# Patient Record
Sex: FEMALE | Race: BLACK OR AFRICAN AMERICAN | NOT HISPANIC OR LATINO | URBAN - METROPOLITAN AREA
[De-identification: names, ages, dates, MRNs, and addresses within clinical notes are randomized per-mention and may not be internally consistent; named-entity substitution may affect disease eponyms.]

---

## 2017-12-14 ENCOUNTER — EMERGENCY (EMERGENCY)
Facility: HOSPITAL | Age: 22
LOS: 1 days | Discharge: HOME | End: 2017-12-14
Attending: EMERGENCY MEDICINE | Admitting: EMERGENCY MEDICINE
Payer: COMMERCIAL

## 2017-12-14 VITALS
TEMPERATURE: 97 F | OXYGEN SATURATION: 97 % | SYSTOLIC BLOOD PRESSURE: 106 MMHG | WEIGHT: 150.8 LBS | RESPIRATION RATE: 18 BRPM | HEART RATE: 81 BPM | DIASTOLIC BLOOD PRESSURE: 74 MMHG

## 2017-12-14 DIAGNOSIS — R10.11 RIGHT UPPER QUADRANT PAIN: ICD-10-CM

## 2017-12-14 DIAGNOSIS — I26.99 OTHER PULMONARY EMBOLISM WITHOUT ACUTE COR PULMONALE: ICD-10-CM

## 2017-12-14 PROCEDURE — 99285 EMERGENCY DEPT VISIT HI MDM: CPT | Mod: 25

## 2017-12-14 PROCEDURE — 71020: CPT | Mod: 26

## 2017-12-14 PROCEDURE — 99053 MED SERV 10PM-8AM 24 HR FAC: CPT

## 2017-12-14 PROCEDURE — 93010 ELECTROCARDIOGRAM REPORT: CPT

## 2017-12-14 NOTE — ED ADULT TRIAGE NOTE - ARRIVAL INFO ADDITIONAL COMMENTS
right side chest pain and right side abd pain started today "when I take a deep breath in it hurts" right side chest pain and right side abd pain started today "when I take a deep breath in it hurts". recent travel, denies leg swelling

## 2017-12-14 NOTE — ED ADULT NURSE NOTE - OBJECTIVE STATEMENT
Pt walked into Ed with c/o of right 8/10 continuos chest pain that radiates to the RUQ abd. Pt states onset was this morning at 8 am. Pt denies fever, chills, N+V+D, blood in stool or urine, dysuria. PT states she had ectopic pregnancy salpingectomy (unknown which side was removed), surgery took place last month. PT. denies taking anything for the pain.

## 2017-12-14 NOTE — ED ADULT NURSE NOTE - NSSISCREENINGQ5_ED_A_ED
"Per 1/5/17 result note:  \"Notes Recorded by Majo Alfaro MD on 1/9/2017 at 8:47 AM  There was some improvement in your potassium (back into the normal range) and your kidney function this time. I suspect the dip in both was related to the reclast. We will continue to monitor both at your next scheduled visit. Please let us know if you have any questions or concerns.\"    Writer called patient and reviewed this result note with patient.    Patient verbalized understanding and in agreement with plan.  ALAN McnallyN, RN    "
Patient is calling to speak with Dr Alfaro or someone on her team.  Wants to discuss what she should be doing since her lab results showed low potassium.  Please call today if possible.  OK to leave message on voicemail.  
No

## 2017-12-15 ENCOUNTER — EMERGENCY (EMERGENCY)
Facility: HOSPITAL | Age: 22
LOS: 1 days | Discharge: HOME | End: 2017-12-15
Attending: EMERGENCY MEDICINE | Admitting: EMERGENCY MEDICINE
Payer: COMMERCIAL

## 2017-12-15 VITALS
TEMPERATURE: 98 F | HEART RATE: 78 BPM | DIASTOLIC BLOOD PRESSURE: 60 MMHG | RESPIRATION RATE: 18 BRPM | SYSTOLIC BLOOD PRESSURE: 100 MMHG | OXYGEN SATURATION: 98 %

## 2017-12-15 VITALS
HEIGHT: 63 IN | DIASTOLIC BLOOD PRESSURE: 78 MMHG | TEMPERATURE: 98 F | SYSTOLIC BLOOD PRESSURE: 113 MMHG | HEART RATE: 69 BPM | RESPIRATION RATE: 18 BRPM | WEIGHT: 152.78 LBS | OXYGEN SATURATION: 98 %

## 2017-12-15 DIAGNOSIS — Z90.79 ACQUIRED ABSENCE OF OTHER GENITAL ORGAN(S): Chronic | ICD-10-CM

## 2017-12-15 DIAGNOSIS — Z76.0 ENCOUNTER FOR ISSUE OF REPEAT PRESCRIPTION: ICD-10-CM

## 2017-12-15 DIAGNOSIS — I26.99 OTHER PULMONARY EMBOLISM WITHOUT ACUTE COR PULMONALE: ICD-10-CM

## 2017-12-15 DIAGNOSIS — Z79.01 LONG TERM (CURRENT) USE OF ANTICOAGULANTS: ICD-10-CM

## 2017-12-15 LAB
ALBUMIN SERPL ELPH-MCNC: 4.2 G/DL — SIGNIFICANT CHANGE UP (ref 3.3–5)
ALP SERPL-CCNC: 47 U/L — SIGNIFICANT CHANGE UP (ref 40–120)
ALT FLD-CCNC: 9 U/L — LOW (ref 10–45)
ANION GAP SERPL CALC-SCNC: 12 MMOL/L — SIGNIFICANT CHANGE UP (ref 5–17)
APPEARANCE UR: CLEAR — SIGNIFICANT CHANGE UP
APTT BLD: 26.9 SEC — LOW (ref 27.5–37.4)
AST SERPL-CCNC: 17 U/L — SIGNIFICANT CHANGE UP (ref 10–40)
BASOPHILS NFR BLD AUTO: 0.4 % — SIGNIFICANT CHANGE UP (ref 0–2)
BILIRUB SERPL-MCNC: 0.2 MG/DL — SIGNIFICANT CHANGE UP (ref 0.2–1.2)
BILIRUB UR-MCNC: NEGATIVE — SIGNIFICANT CHANGE UP
BUN SERPL-MCNC: 16 MG/DL — SIGNIFICANT CHANGE UP (ref 7–23)
CALCIUM SERPL-MCNC: 9.2 MG/DL — SIGNIFICANT CHANGE UP (ref 8.4–10.5)
CHLORIDE SERPL-SCNC: 101 MMOL/L — SIGNIFICANT CHANGE UP (ref 96–108)
CO2 SERPL-SCNC: 23 MMOL/L — SIGNIFICANT CHANGE UP (ref 22–31)
COLOR SPEC: YELLOW — SIGNIFICANT CHANGE UP
CREAT SERPL-MCNC: 0.78 MG/DL — SIGNIFICANT CHANGE UP (ref 0.5–1.3)
D DIMER BLD IA.RAPID-MCNC: 437 NG/ML DDU — HIGH
DIFF PNL FLD: NEGATIVE — SIGNIFICANT CHANGE UP
EOSINOPHIL NFR BLD AUTO: 1.4 % — SIGNIFICANT CHANGE UP (ref 0–6)
GLUCOSE SERPL-MCNC: 90 MG/DL — SIGNIFICANT CHANGE UP (ref 70–99)
GLUCOSE UR QL: NEGATIVE — SIGNIFICANT CHANGE UP
HCT VFR BLD CALC: 35.9 % — SIGNIFICANT CHANGE UP (ref 34.5–45)
HGB BLD-MCNC: 11.8 G/DL — SIGNIFICANT CHANGE UP (ref 11.5–15.5)
INR BLD: 1.09 — SIGNIFICANT CHANGE UP (ref 0.88–1.16)
KETONES UR-MCNC: NEGATIVE — SIGNIFICANT CHANGE UP
LEUKOCYTE ESTERASE UR-ACNC: NEGATIVE — SIGNIFICANT CHANGE UP
LIDOCAIN IGE QN: 16 U/L — SIGNIFICANT CHANGE UP (ref 7–60)
LYMPHOCYTES # BLD AUTO: 39.1 % — SIGNIFICANT CHANGE UP (ref 13–44)
MCHC RBC-ENTMCNC: 30.7 PG — SIGNIFICANT CHANGE UP (ref 27–34)
MCHC RBC-ENTMCNC: 32.9 G/DL — SIGNIFICANT CHANGE UP (ref 32–36)
MCV RBC AUTO: 93.5 FL — SIGNIFICANT CHANGE UP (ref 80–100)
MONOCYTES NFR BLD AUTO: 9.4 % — SIGNIFICANT CHANGE UP (ref 2–14)
NEUTROPHILS NFR BLD AUTO: 49.7 % — SIGNIFICANT CHANGE UP (ref 43–77)
NITRITE UR-MCNC: NEGATIVE — SIGNIFICANT CHANGE UP
PH UR: 6 — SIGNIFICANT CHANGE UP (ref 5–8)
PLATELET # BLD AUTO: 172 K/UL — SIGNIFICANT CHANGE UP (ref 150–400)
POTASSIUM SERPL-MCNC: 3.6 MMOL/L — SIGNIFICANT CHANGE UP (ref 3.5–5.3)
POTASSIUM SERPL-SCNC: 3.6 MMOL/L — SIGNIFICANT CHANGE UP (ref 3.5–5.3)
PROT SERPL-MCNC: 8.3 G/DL — SIGNIFICANT CHANGE UP (ref 6–8.3)
PROT UR-MCNC: NEGATIVE MG/DL — SIGNIFICANT CHANGE UP
PROTHROM AB SERPL-ACNC: 12.1 SEC — SIGNIFICANT CHANGE UP (ref 9.8–12.7)
RBC # BLD: 3.84 M/UL — SIGNIFICANT CHANGE UP (ref 3.8–5.2)
RBC # FLD: 13.2 % — SIGNIFICANT CHANGE UP (ref 10.3–16.9)
SODIUM SERPL-SCNC: 136 MMOL/L — SIGNIFICANT CHANGE UP (ref 135–145)
SP GR SPEC: >=1.03 — SIGNIFICANT CHANGE UP (ref 1–1.03)
UROBILINOGEN FLD QL: 0.2 E.U./DL — SIGNIFICANT CHANGE UP
WBC # BLD: 6.9 K/UL — SIGNIFICANT CHANGE UP (ref 3.8–10.5)
WBC # FLD AUTO: 6.9 K/UL — SIGNIFICANT CHANGE UP (ref 3.8–10.5)

## 2017-12-15 PROCEDURE — 83690 ASSAY OF LIPASE: CPT

## 2017-12-15 PROCEDURE — 80053 COMPREHEN METABOLIC PANEL: CPT

## 2017-12-15 PROCEDURE — 74177 CT ABD & PELVIS W/CONTRAST: CPT | Mod: 26

## 2017-12-15 PROCEDURE — 71275 CT ANGIOGRAPHY CHEST: CPT | Mod: 26

## 2017-12-15 PROCEDURE — 71275 CT ANGIOGRAPHY CHEST: CPT

## 2017-12-15 PROCEDURE — 81003 URINALYSIS AUTO W/O SCOPE: CPT

## 2017-12-15 PROCEDURE — 99283 EMERGENCY DEPT VISIT LOW MDM: CPT

## 2017-12-15 PROCEDURE — 93005 ELECTROCARDIOGRAM TRACING: CPT

## 2017-12-15 PROCEDURE — 36415 COLL VENOUS BLD VENIPUNCTURE: CPT

## 2017-12-15 PROCEDURE — 85025 COMPLETE CBC W/AUTO DIFF WBC: CPT

## 2017-12-15 PROCEDURE — 85379 FIBRIN DEGRADATION QUANT: CPT

## 2017-12-15 PROCEDURE — 99284 EMERGENCY DEPT VISIT MOD MDM: CPT | Mod: 25

## 2017-12-15 PROCEDURE — 71046 X-RAY EXAM CHEST 2 VIEWS: CPT

## 2017-12-15 PROCEDURE — 85730 THROMBOPLASTIN TIME PARTIAL: CPT

## 2017-12-15 PROCEDURE — 74177 CT ABD & PELVIS W/CONTRAST: CPT

## 2017-12-15 PROCEDURE — 85610 PROTHROMBIN TIME: CPT

## 2017-12-15 PROCEDURE — 71020: CPT | Mod: 26

## 2017-12-15 RX ORDER — FONDAPARINUX SODIUM 2.5 MG/.5ML
1 INJECTION, SOLUTION SUBCUTANEOUS
Qty: 42 | Refills: 0 | OUTPATIENT
Start: 2017-12-15 | End: 2018-01-04

## 2017-12-15 RX ORDER — SODIUM CHLORIDE 9 MG/ML
1000 INJECTION INTRAMUSCULAR; INTRAVENOUS; SUBCUTANEOUS ONCE
Qty: 0 | Refills: 0 | Status: COMPLETED | OUTPATIENT
Start: 2017-12-15 | End: 2017-12-15

## 2017-12-15 RX ORDER — SODIUM CHLORIDE 9 MG/ML
1000 INJECTION INTRAMUSCULAR; INTRAVENOUS; SUBCUTANEOUS
Qty: 0 | Refills: 0 | Status: DISCONTINUED | OUTPATIENT
Start: 2017-12-15 | End: 2017-12-18

## 2017-12-15 RX ORDER — FONDAPARINUX SODIUM 2.5 MG/.5ML
1 INJECTION, SOLUTION SUBCUTANEOUS
Qty: 14 | Refills: 0 | OUTPATIENT
Start: 2017-12-15 | End: 2017-12-21

## 2017-12-15 RX ORDER — RIVAROXABAN 15 MG-20MG
15 KIT ORAL ONCE
Qty: 0 | Refills: 0 | Status: COMPLETED | OUTPATIENT
Start: 2017-12-15 | End: 2017-12-15

## 2017-12-15 RX ORDER — IOHEXOL 300 MG/ML
50 INJECTION, SOLUTION INTRAVENOUS ONCE
Qty: 0 | Refills: 0 | Status: COMPLETED | OUTPATIENT
Start: 2017-12-15 | End: 2017-12-15

## 2017-12-15 RX ADMIN — SODIUM CHLORIDE 2000 MILLILITER(S): 9 INJECTION INTRAMUSCULAR; INTRAVENOUS; SUBCUTANEOUS at 02:01

## 2017-12-15 RX ADMIN — IOHEXOL 50 MILLILITER(S): 300 INJECTION, SOLUTION INTRAVENOUS at 00:27

## 2017-12-15 RX ADMIN — SODIUM CHLORIDE 125 MILLILITER(S): 9 INJECTION INTRAMUSCULAR; INTRAVENOUS; SUBCUTANEOUS at 00:27

## 2017-12-15 RX ADMIN — RIVAROXABAN 15 MILLIGRAM(S): KIT at 03:32

## 2017-12-15 NOTE — ED PROVIDER NOTE - MEDICAL DECISION MAKING DETAILS
here w/ need for medication for PE, unable to fill in pharmacy. 1 weeks worth given, pt to call insurance and find out what happened and get insurance reinstated.

## 2017-12-15 NOTE — ED PROVIDER NOTE - CARE PLAN
Principal Discharge DX:	Other acute pulmonary embolism without acute cor pulmonale  Secondary Diagnosis:	Medication refill

## 2017-12-15 NOTE — ED ADULT NURSE NOTE - OBJECTIVE STATEMENT
Pt came to ED for med refill.  Pt denies any medical complaints. Pt was seen at [facility] yesterday, states pharmacy will not taker her insurance.  Pt alert and orientedx3.

## 2017-12-15 NOTE — ED PROVIDER NOTE - MEDICAL DECISION MAKING DETAILS
Pt c/o ruq pleuritic type pain w some risk for pe (travel, surgery) w/o sob, hypoxia; no ttp on abd exam.  S/p cholecystectomy.  Doubt cbd stone.  Doubt post op complication.  No sx to suggest Luigi Singleton.  Plan labs, ct abd, poss cta for pe if abnl ddimer; pt declined pain meds.  Reassess.

## 2017-12-15 NOTE — ED PROVIDER NOTE - PROGRESS NOTE DETAILS
Pt had ct abd prior to my review of her ddimer level; cta for pe added to labs w iv hydration to help protect her kidneys. CTA + segmental pe.  Will start xarelto; pt to fu w pmd.

## 2017-12-15 NOTE — ED PROVIDER NOTE - OBJECTIVE STATEMENT
23yo F diagnosed with PE yesterday, small, thought safe for dc and start on NOAC, however when she went to pharmacy to fill - found out her insurance got canceled. Returns to ED requesting help with rx. symptoms have not progressed since discharge, feels well otherwise.

## 2017-12-15 NOTE — ED PROVIDER NOTE - OBJECTIVE STATEMENT
21 yo female h/o ectopic s/p salpingectomy (unsure which side) 10/17, cholecystectomy c/o ruq pain since this am - worse w taking a deep breath, no radiation, no associated n/v/d, fever, flank pain, dysuria, hematuria, abnl discharge or vag bleeding (lmp late Nov), cough, sob, palpitations, le pain/swelling, h/o kidney stone, h/o or fh dvt/pe, ocp or tob use.  + travel to [facility] 11/26/17.  Pain 6/10, sharp, constant.

## 2017-12-15 NOTE — ED PROVIDER NOTE - DIAGNOSTIC INTERPRETATION
ER Physician:  Kadie Director  CHEST XRAY INTERPRETATION: lungs clear, heart shadow normal, bony structures intact

## 2017-12-21 ENCOUNTER — EMERGENCY (EMERGENCY)
Facility: HOSPITAL | Age: 22
LOS: 1 days | Discharge: HOME | End: 2017-12-21
Attending: EMERGENCY MEDICINE | Admitting: EMERGENCY MEDICINE
Payer: COMMERCIAL

## 2017-12-21 VITALS
HEART RATE: 73 BPM | SYSTOLIC BLOOD PRESSURE: 107 MMHG | RESPIRATION RATE: 19 BRPM | DIASTOLIC BLOOD PRESSURE: 67 MMHG | OXYGEN SATURATION: 100 % | TEMPERATURE: 99 F

## 2017-12-21 VITALS
HEIGHT: 63 IN | OXYGEN SATURATION: 100 % | WEIGHT: 145.06 LBS | RESPIRATION RATE: 19 BRPM | DIASTOLIC BLOOD PRESSURE: 72 MMHG | TEMPERATURE: 97 F | HEART RATE: 81 BPM | SYSTOLIC BLOOD PRESSURE: 105 MMHG

## 2017-12-21 DIAGNOSIS — Z79.899 OTHER LONG TERM (CURRENT) DRUG THERAPY: ICD-10-CM

## 2017-12-21 DIAGNOSIS — Z90.79 ACQUIRED ABSENCE OF OTHER GENITAL ORGAN(S): Chronic | ICD-10-CM

## 2017-12-21 DIAGNOSIS — R00.2 PALPITATIONS: ICD-10-CM

## 2017-12-21 DIAGNOSIS — R07.89 OTHER CHEST PAIN: ICD-10-CM

## 2017-12-21 DIAGNOSIS — R06.02 SHORTNESS OF BREATH: ICD-10-CM

## 2017-12-21 LAB
ALBUMIN SERPL ELPH-MCNC: 4.4 G/DL — SIGNIFICANT CHANGE UP (ref 3.3–5)
ALP SERPL-CCNC: 45 U/L — SIGNIFICANT CHANGE UP (ref 40–120)
ALT FLD-CCNC: 10 U/L — SIGNIFICANT CHANGE UP (ref 10–45)
ANION GAP SERPL CALC-SCNC: 11 MMOL/L — SIGNIFICANT CHANGE UP (ref 5–17)
APTT BLD: 37.4 SEC — SIGNIFICANT CHANGE UP (ref 27.5–37.4)
AST SERPL-CCNC: 20 U/L — SIGNIFICANT CHANGE UP (ref 10–40)
BASOPHILS NFR BLD AUTO: 0.9 % — SIGNIFICANT CHANGE UP (ref 0–2)
BILIRUB SERPL-MCNC: 0.2 MG/DL — SIGNIFICANT CHANGE UP (ref 0.2–1.2)
BUN SERPL-MCNC: 11 MG/DL — SIGNIFICANT CHANGE UP (ref 7–23)
CALCIUM SERPL-MCNC: 9 MG/DL — SIGNIFICANT CHANGE UP (ref 8.4–10.5)
CHLORIDE SERPL-SCNC: 102 MMOL/L — SIGNIFICANT CHANGE UP (ref 96–108)
CO2 SERPL-SCNC: 24 MMOL/L — SIGNIFICANT CHANGE UP (ref 22–31)
CREAT SERPL-MCNC: 0.87 MG/DL — SIGNIFICANT CHANGE UP (ref 0.5–1.3)
EOSINOPHIL NFR BLD AUTO: 2 % — SIGNIFICANT CHANGE UP (ref 0–6)
EXTRA SST TUBE: SIGNIFICANT CHANGE UP
GLUCOSE SERPL-MCNC: 96 MG/DL — SIGNIFICANT CHANGE UP (ref 70–99)
HCT VFR BLD CALC: 32.1 % — LOW (ref 34.5–45)
HGB BLD-MCNC: 10.9 G/DL — LOW (ref 11.5–15.5)
INR BLD: 2.16 — HIGH (ref 0.88–1.16)
LYMPHOCYTES # BLD AUTO: 51.6 % — HIGH (ref 13–44)
MCHC RBC-ENTMCNC: 30.2 PG — SIGNIFICANT CHANGE UP (ref 27–34)
MCHC RBC-ENTMCNC: 34 G/DL — SIGNIFICANT CHANGE UP (ref 32–36)
MCV RBC AUTO: 88.9 FL — SIGNIFICANT CHANGE UP (ref 80–100)
MONOCYTES NFR BLD AUTO: 13.2 % — SIGNIFICANT CHANGE UP (ref 2–14)
NEUTROPHILS NFR BLD AUTO: 32.3 % — LOW (ref 43–77)
PLATELET # BLD AUTO: 170 K/UL — SIGNIFICANT CHANGE UP (ref 150–400)
POTASSIUM SERPL-MCNC: 3.9 MMOL/L — SIGNIFICANT CHANGE UP (ref 3.5–5.3)
POTASSIUM SERPL-SCNC: 3.9 MMOL/L — SIGNIFICANT CHANGE UP (ref 3.5–5.3)
PROT SERPL-MCNC: 7.9 G/DL — SIGNIFICANT CHANGE UP (ref 6–8.3)
PROTHROM AB SERPL-ACNC: 24.4 SEC — HIGH (ref 9.8–12.7)
RBC # BLD: 3.61 M/UL — LOW (ref 3.8–5.2)
RBC # FLD: 12.5 % — SIGNIFICANT CHANGE UP (ref 10.3–16.9)
SODIUM SERPL-SCNC: 137 MMOL/L — SIGNIFICANT CHANGE UP (ref 135–145)
TROPONIN T SERPL-MCNC: <0.01 NG/ML — SIGNIFICANT CHANGE UP (ref 0–0.01)
WBC # BLD: 4.6 K/UL — SIGNIFICANT CHANGE UP (ref 3.8–10.5)
WBC # FLD AUTO: 4.6 K/UL — SIGNIFICANT CHANGE UP (ref 3.8–10.5)

## 2017-12-21 PROCEDURE — 85025 COMPLETE CBC W/AUTO DIFF WBC: CPT

## 2017-12-21 PROCEDURE — 85730 THROMBOPLASTIN TIME PARTIAL: CPT

## 2017-12-21 PROCEDURE — 71275 CT ANGIOGRAPHY CHEST: CPT | Mod: 26

## 2017-12-21 PROCEDURE — 83880 ASSAY OF NATRIURETIC PEPTIDE: CPT

## 2017-12-21 PROCEDURE — 85610 PROTHROMBIN TIME: CPT

## 2017-12-21 PROCEDURE — 93005 ELECTROCARDIOGRAM TRACING: CPT

## 2017-12-21 PROCEDURE — 93010 ELECTROCARDIOGRAM REPORT: CPT

## 2017-12-21 PROCEDURE — 36415 COLL VENOUS BLD VENIPUNCTURE: CPT

## 2017-12-21 PROCEDURE — 84484 ASSAY OF TROPONIN QUANT: CPT

## 2017-12-21 PROCEDURE — 80053 COMPREHEN METABOLIC PANEL: CPT

## 2017-12-21 PROCEDURE — 99285 EMERGENCY DEPT VISIT HI MDM: CPT | Mod: 25

## 2017-12-21 PROCEDURE — 71275 CT ANGIOGRAPHY CHEST: CPT

## 2017-12-21 PROCEDURE — 99284 EMERGENCY DEPT VISIT MOD MDM: CPT | Mod: 25

## 2017-12-21 NOTE — ED PROVIDER NOTE - MEDICAL DECISION MAKING DETAILS
chest pain, palpitation, dx of PE, on xarelto chest pain, palpitation, dx of PE, on xarelto, unclear status of hypercoagulability, possibly worsening PE despite NOAC, will repeat CTA, reassess, cardiac panels ordered for PE prognosis, very low suspicion for ACS

## 2017-12-21 NOTE — ED ADULT NURSE NOTE - OBJECTIVE STATEMENT
pt states dx with PE last week. on xaralto.  started having midsternal throbbing chest pain 8/10 this afternoon, currently 6/10.  denies SOB, palpitations, dizziness, headache.   lungs cta.  cardiac monitor aplied. ekg completed at bedside. denies n/v/d. no ELOY noted

## 2017-12-21 NOTE — ED PROVIDER NOTE - PROGRESS NOTE DETAILS
ct reviewed, resolution of clots, d/w pt findings, instructed to still follow up, and continue xalreto ct reviewed w/ patient, aware not final read, does not wish to wait for final result, resolution of clots, d/w pt findings, instructed to still follow up, and continue xalreto

## 2017-12-21 NOTE — ED PROVIDER NOTE - OBJECTIVE STATEMENT
22 yof pw sternal cp today, so palpitations and felt "funny" with breathing.  dx w/ PE 2 wk ago, on xarelto, compliant.  no hx of known lupus/sle/hypercoagulable state.  no vomiting.  no hx of CAD.  no leg pain or swelling.  pt states cp felt different in location, previously was in right lower chest, now sternal. 22 yof pw sternal cp today, so palpitations and felt "funny" with breathing.  dx w/ PE 2 wk ago, on xarelto, compliant.  no hx of known lupus/sle/hypercoagulable state.  no vomiting.  no hx of CAD.  no leg pain or swelling.  pt states cp felt different in location, previously was in right lower chest, now sternal.  nonexertional, no dyspnea.

## 2018-01-02 ENCOUNTER — EMERGENCY (EMERGENCY)
Facility: HOSPITAL | Age: 23
LOS: 1 days | Discharge: HOME | End: 2018-01-02
Attending: EMERGENCY MEDICINE | Admitting: EMERGENCY MEDICINE
Payer: COMMERCIAL

## 2018-01-02 VITALS
SYSTOLIC BLOOD PRESSURE: 120 MMHG | RESPIRATION RATE: 18 BRPM | DIASTOLIC BLOOD PRESSURE: 66 MMHG | WEIGHT: 145.06 LBS | TEMPERATURE: 98 F | OXYGEN SATURATION: 100 % | HEART RATE: 88 BPM

## 2018-01-02 DIAGNOSIS — Z90.79 ACQUIRED ABSENCE OF OTHER GENITAL ORGAN(S): Chronic | ICD-10-CM

## 2018-01-02 DIAGNOSIS — Z79.899 OTHER LONG TERM (CURRENT) DRUG THERAPY: ICD-10-CM

## 2018-01-02 DIAGNOSIS — R10.10 UPPER ABDOMINAL PAIN, UNSPECIFIED: ICD-10-CM

## 2018-01-02 DIAGNOSIS — R10.31 RIGHT LOWER QUADRANT PAIN: ICD-10-CM

## 2018-01-02 LAB
ALBUMIN SERPL ELPH-MCNC: 4.3 G/DL — SIGNIFICANT CHANGE UP (ref 3.3–5)
ALP SERPL-CCNC: 42 U/L — SIGNIFICANT CHANGE UP (ref 40–120)
ALT FLD-CCNC: 11 U/L — SIGNIFICANT CHANGE UP (ref 10–45)
ANION GAP SERPL CALC-SCNC: 12 MMOL/L — SIGNIFICANT CHANGE UP (ref 5–17)
APPEARANCE UR: CLEAR — SIGNIFICANT CHANGE UP
APTT BLD: 35 SEC — SIGNIFICANT CHANGE UP (ref 27.5–37.4)
AST SERPL-CCNC: 21 U/L — SIGNIFICANT CHANGE UP (ref 10–40)
BASOPHILS NFR BLD AUTO: 0.2 % — SIGNIFICANT CHANGE UP (ref 0–2)
BILIRUB SERPL-MCNC: 0.2 MG/DL — SIGNIFICANT CHANGE UP (ref 0.2–1.2)
BILIRUB UR-MCNC: NEGATIVE — SIGNIFICANT CHANGE UP
BUN SERPL-MCNC: 15 MG/DL — SIGNIFICANT CHANGE UP (ref 7–23)
CALCIUM SERPL-MCNC: 9.4 MG/DL — SIGNIFICANT CHANGE UP (ref 8.4–10.5)
CHLORIDE SERPL-SCNC: 102 MMOL/L — SIGNIFICANT CHANGE UP (ref 96–108)
CO2 SERPL-SCNC: 24 MMOL/L — SIGNIFICANT CHANGE UP (ref 22–31)
COLOR SPEC: YELLOW — SIGNIFICANT CHANGE UP
CREAT SERPL-MCNC: 0.74 MG/DL — SIGNIFICANT CHANGE UP (ref 0.5–1.3)
D DIMER BLD IA.RAPID-MCNC: <150 NG/ML DDU — SIGNIFICANT CHANGE UP
DIFF PNL FLD: NEGATIVE — SIGNIFICANT CHANGE UP
EOSINOPHIL NFR BLD AUTO: 1.8 % — SIGNIFICANT CHANGE UP (ref 0–6)
GLUCOSE SERPL-MCNC: 92 MG/DL — SIGNIFICANT CHANGE UP (ref 70–99)
GLUCOSE UR QL: NEGATIVE — SIGNIFICANT CHANGE UP
HCT VFR BLD CALC: 34.9 % — SIGNIFICANT CHANGE UP (ref 34.5–45)
HGB BLD-MCNC: 11.3 G/DL — LOW (ref 11.5–15.5)
INR BLD: 1.73 — HIGH (ref 0.88–1.16)
KETONES UR-MCNC: NEGATIVE — SIGNIFICANT CHANGE UP
LEUKOCYTE ESTERASE UR-ACNC: NEGATIVE — SIGNIFICANT CHANGE UP
LIDOCAIN IGE QN: 23 U/L — SIGNIFICANT CHANGE UP (ref 7–60)
LYMPHOCYTES # BLD AUTO: 44.8 % — HIGH (ref 13–44)
MCHC RBC-ENTMCNC: 29.6 PG — SIGNIFICANT CHANGE UP (ref 27–34)
MCHC RBC-ENTMCNC: 32.4 G/DL — SIGNIFICANT CHANGE UP (ref 32–36)
MCV RBC AUTO: 91.4 FL — SIGNIFICANT CHANGE UP (ref 80–100)
MONOCYTES NFR BLD AUTO: 11.8 % — SIGNIFICANT CHANGE UP (ref 2–14)
NEUTROPHILS NFR BLD AUTO: 41.4 % — LOW (ref 43–77)
NITRITE UR-MCNC: NEGATIVE — SIGNIFICANT CHANGE UP
PH UR: 6.5 — SIGNIFICANT CHANGE UP (ref 5–8)
PLATELET # BLD AUTO: 148 K/UL — LOW (ref 150–400)
POTASSIUM SERPL-MCNC: 3.7 MMOL/L — SIGNIFICANT CHANGE UP (ref 3.5–5.3)
POTASSIUM SERPL-SCNC: 3.7 MMOL/L — SIGNIFICANT CHANGE UP (ref 3.5–5.3)
PROT SERPL-MCNC: 8 G/DL — SIGNIFICANT CHANGE UP (ref 6–8.3)
PROT UR-MCNC: NEGATIVE MG/DL — SIGNIFICANT CHANGE UP
PROTHROM AB SERPL-ACNC: 19.4 SEC — HIGH (ref 9.8–12.7)
RBC # BLD: 3.82 M/UL — SIGNIFICANT CHANGE UP (ref 3.8–5.2)
RBC # FLD: 12.1 % — SIGNIFICANT CHANGE UP (ref 10.3–16.9)
SODIUM SERPL-SCNC: 138 MMOL/L — SIGNIFICANT CHANGE UP (ref 135–145)
SP GR SPEC: 1.02 — SIGNIFICANT CHANGE UP (ref 1–1.03)
UROBILINOGEN FLD QL: 0.2 E.U./DL — SIGNIFICANT CHANGE UP
WBC # BLD: 5.1 K/UL — SIGNIFICANT CHANGE UP (ref 3.8–10.5)
WBC # FLD AUTO: 5.1 K/UL — SIGNIFICANT CHANGE UP (ref 3.8–10.5)

## 2018-01-02 PROCEDURE — 85730 THROMBOPLASTIN TIME PARTIAL: CPT

## 2018-01-02 PROCEDURE — 99285 EMERGENCY DEPT VISIT HI MDM: CPT | Mod: 25

## 2018-01-02 PROCEDURE — 85025 COMPLETE CBC W/AUTO DIFF WBC: CPT

## 2018-01-02 PROCEDURE — 83690 ASSAY OF LIPASE: CPT

## 2018-01-02 PROCEDURE — 93010 ELECTROCARDIOGRAM REPORT: CPT

## 2018-01-02 PROCEDURE — 85379 FIBRIN DEGRADATION QUANT: CPT

## 2018-01-02 PROCEDURE — 80053 COMPREHEN METABOLIC PANEL: CPT

## 2018-01-02 PROCEDURE — 99284 EMERGENCY DEPT VISIT MOD MDM: CPT | Mod: 25

## 2018-01-02 PROCEDURE — 36415 COLL VENOUS BLD VENIPUNCTURE: CPT

## 2018-01-02 PROCEDURE — 85610 PROTHROMBIN TIME: CPT

## 2018-01-02 PROCEDURE — 93005 ELECTROCARDIOGRAM TRACING: CPT

## 2018-01-02 PROCEDURE — 81003 URINALYSIS AUTO W/O SCOPE: CPT

## 2018-01-02 NOTE — ED PROVIDER NOTE - GASTROINTESTINAL, MLM
Abdomen soft, non-tender, no guarding. No RUQ tenderness to deep palpation. No suprapubic tenderness

## 2018-01-02 NOTE — ED ADULT NURSE NOTE - OBJECTIVE STATEMENT
21 yo F c.o right side abdominal, flank and chest pain x 2 days. Pt states " I had a PE 2 weeks ago and started on Xarelto and was told it dissolved, this pain feels the same as the pain I was having when diagnosed with the PE".  Pt rates pain as an 8:10 at this time which is worse when deep breathing and with certain movements. Pt is A&Ox3 at this time and denies SOB, numbness or tingling to upper or lower extremities at this time. Pt also c.o dry skin rash on left upper leg x 1 day.  EKG done at bedside with SR noted at this time and placed on cont pulse ox and telemetry monitoring. Will continue to monitor.

## 2018-01-02 NOTE — ED PROVIDER NOTE - MEDICAL DECISION MAKING DETAILS
Patient with new onset abdominal pain. labs findings not consistent with new PE. Physical exam without evidence of abdominal tenderness

## 2018-01-02 NOTE — ED PROVIDER NOTE - OBJECTIVE STATEMENT
22F with h/o ectopic pregnancy s/p salpingectomy with recent trip from , presenting to ED 2 weeks ago with complaints of abdominal pain found to have PE. Started on xarelto and discharged. Presented 1 week niko with 22F with h/o ectopic pregnancy s/p salpingectomy with recent trip from , presenting to ED 2 weeks ago with complaints of abdominal pain found to have PE. Started on xarelto and discharged. Presented 1 week ago and found to have resolution of the PE. Presenting today for a new abdominal pain that she states is similar to initial one at the time of diagnosis of PE. Pain is in the RUQ and RLQ made worse with deep breaths.

## 2018-01-02 NOTE — ED ADULT TRIAGE NOTE - CHIEF COMPLAINT QUOTE
right upper and right abdominal pain since yesterday, also complained of rashes upper back and left thigh for 3 days with itchiness hx of PE and pt been on xarelto

## 2018-01-02 NOTE — ED PROVIDER NOTE - ATTENDING CONTRIBUTION TO CARE
22F with h/o ectopic pregnancy s/p salpingectomy with recent trip from , presenting to ED 2 weeks ago with complaints of abdominal pain found to have PE. Started on xarelto and discharged. Presented 1 week ago and found to have resolution of the PE. Presenting today for a new abdominal pain that she states is similar to initial one at the time of diagnosis of PE. Pain is in the RUQ and RLQ made worse with deep breaths.  PE no focal tenderness, labs all wnl including neg d-dimer pt with recent imaging including cta chest and abd pelvis with resolving clot nand no focal acute abd findings - pt reassured and has pmd follow up this week to determine length of eliquis, etc

## 2018-01-03 VITALS
OXYGEN SATURATION: 99 % | RESPIRATION RATE: 18 BRPM | TEMPERATURE: 98 F | DIASTOLIC BLOOD PRESSURE: 59 MMHG | SYSTOLIC BLOOD PRESSURE: 106 MMHG | HEART RATE: 78 BPM

## 2018-06-04 ENCOUNTER — EMERGENCY (EMERGENCY)
Facility: HOSPITAL | Age: 23
LOS: 1 days | Discharge: HOME | End: 2018-06-04
Admitting: EMERGENCY MEDICINE
Payer: COMMERCIAL

## 2018-06-04 VITALS
SYSTOLIC BLOOD PRESSURE: 104 MMHG | RESPIRATION RATE: 19 BRPM | OXYGEN SATURATION: 99 % | WEIGHT: 147.05 LBS | DIASTOLIC BLOOD PRESSURE: 68 MMHG | TEMPERATURE: 98 F | HEART RATE: 88 BPM

## 2018-06-04 DIAGNOSIS — Z79.52 LONG TERM (CURRENT) USE OF SYSTEMIC STEROIDS: ICD-10-CM

## 2018-06-04 DIAGNOSIS — J40 BRONCHITIS, NOT SPECIFIED AS ACUTE OR CHRONIC: ICD-10-CM

## 2018-06-04 DIAGNOSIS — R05 COUGH: ICD-10-CM

## 2018-06-04 DIAGNOSIS — Z90.79 ACQUIRED ABSENCE OF OTHER GENITAL ORGAN(S): Chronic | ICD-10-CM

## 2018-06-04 DIAGNOSIS — Z79.51 LONG TERM (CURRENT) USE OF INHALED STEROIDS: ICD-10-CM

## 2018-06-04 PROCEDURE — 71046 X-RAY EXAM CHEST 2 VIEWS: CPT | Mod: 26

## 2018-06-04 PROCEDURE — 99284 EMERGENCY DEPT VISIT MOD MDM: CPT

## 2018-06-04 PROCEDURE — 99283 EMERGENCY DEPT VISIT LOW MDM: CPT | Mod: 25

## 2018-06-04 PROCEDURE — 71046 X-RAY EXAM CHEST 2 VIEWS: CPT

## 2018-06-04 PROCEDURE — 94640 AIRWAY INHALATION TREATMENT: CPT

## 2018-06-04 RX ORDER — IPRATROPIUM/ALBUTEROL SULFATE 18-103MCG
3 AEROSOL WITH ADAPTER (GRAM) INHALATION ONCE
Qty: 0 | Refills: 0 | Status: COMPLETED | OUTPATIENT
Start: 2018-06-04 | End: 2018-06-04

## 2018-06-04 RX ORDER — ALBUTEROL 90 UG/1
2 AEROSOL, METERED ORAL
Qty: 1 | Refills: 0 | OUTPATIENT
Start: 2018-06-04 | End: 2018-07-03

## 2018-06-04 RX ADMIN — Medication 3 MILLILITER(S): at 20:57

## 2018-06-04 RX ADMIN — Medication 60 MILLIGRAM(S): at 20:57

## 2018-06-04 NOTE — ED PROVIDER NOTE - OBJECTIVE STATEMENT
21 y/o f hx PE presents c/o cough x 1 week.  Pt stating she has felt and heard herself wheezing.  Pt stating having some chest tightness, no chest pain.  Denies fever, chills, n/v/d, all other ROS negative.

## 2018-06-04 NOTE — ED ADULT NURSE NOTE - OBJECTIVE STATEMENT
Pt presents to ED with c/o productive cough and nasal congestion since Thursday. No CP/SOB. Denies fever/chills.

## 2018-06-04 NOTE — ED PROVIDER NOTE - MEDICAL DECISION MAKING DETAILS
21 y/o f hx PE presents with cough x 1 week; afebrile, wheezing on exam, cxr negative, no tachypnea or tachycardia, sx resolved with neb and steroids, will d/c with rx prednisone, inhaler, f/u pmd

## 2018-06-05 RX ORDER — ALBUTEROL 90 UG/1
2 AEROSOL, METERED ORAL
Qty: 1 | Refills: 0 | OUTPATIENT
Start: 2018-06-05 | End: 2018-07-04

## 2019-07-17 ENCOUNTER — EMERGENCY (EMERGENCY)
Facility: HOSPITAL | Age: 24
LOS: 1 days | Discharge: HOME | End: 2019-07-17
Attending: EMERGENCY MEDICINE | Admitting: EMERGENCY MEDICINE
Payer: SELF-PAY

## 2019-07-17 VITALS
SYSTOLIC BLOOD PRESSURE: 130 MMHG | HEART RATE: 80 BPM | OXYGEN SATURATION: 100 % | DIASTOLIC BLOOD PRESSURE: 78 MMHG | RESPIRATION RATE: 18 BRPM | TEMPERATURE: 98 F

## 2019-07-17 VITALS
RESPIRATION RATE: 16 BRPM | TEMPERATURE: 98 F | WEIGHT: 147.93 LBS | DIASTOLIC BLOOD PRESSURE: 84 MMHG | HEART RATE: 85 BPM | HEIGHT: 64 IN | OXYGEN SATURATION: 100 % | SYSTOLIC BLOOD PRESSURE: 139 MMHG

## 2019-07-17 DIAGNOSIS — Z90.79 ACQUIRED ABSENCE OF OTHER GENITAL ORGAN(S): Chronic | ICD-10-CM

## 2019-07-17 DIAGNOSIS — N92.6 IRREGULAR MENSTRUATION, UNSPECIFIED: ICD-10-CM

## 2019-07-17 DIAGNOSIS — N93.9 ABNORMAL UTERINE AND VAGINAL BLEEDING, UNSPECIFIED: ICD-10-CM

## 2019-07-17 LAB
BASOPHILS # BLD AUTO: 0.04 K/UL — SIGNIFICANT CHANGE UP (ref 0–0.2)
BASOPHILS NFR BLD AUTO: 0.7 % — SIGNIFICANT CHANGE UP (ref 0–2)
EOSINOPHIL # BLD AUTO: 0.21 K/UL — SIGNIFICANT CHANGE UP (ref 0–0.5)
EOSINOPHIL NFR BLD AUTO: 3.7 % — SIGNIFICANT CHANGE UP (ref 0–6)
HCT VFR BLD CALC: 38.2 % — SIGNIFICANT CHANGE UP (ref 34.5–45)
HGB BLD-MCNC: 12.4 G/DL — SIGNIFICANT CHANGE UP (ref 11.5–15.5)
HIV 1+2 AB+HIV1 P24 AG SERPL QL IA: SIGNIFICANT CHANGE UP
IMM GRANULOCYTES NFR BLD AUTO: 0.2 % — SIGNIFICANT CHANGE UP (ref 0–1.5)
LYMPHOCYTES # BLD AUTO: 2.27 K/UL — SIGNIFICANT CHANGE UP (ref 1–3.3)
LYMPHOCYTES # BLD AUTO: 40.5 % — SIGNIFICANT CHANGE UP (ref 13–44)
MCHC RBC-ENTMCNC: 31.3 PG — SIGNIFICANT CHANGE UP (ref 27–34)
MCHC RBC-ENTMCNC: 32.5 GM/DL — SIGNIFICANT CHANGE UP (ref 32–36)
MCV RBC AUTO: 96.5 FL — SIGNIFICANT CHANGE UP (ref 80–100)
MONOCYTES # BLD AUTO: 0.51 K/UL — SIGNIFICANT CHANGE UP (ref 0–0.9)
MONOCYTES NFR BLD AUTO: 9.1 % — SIGNIFICANT CHANGE UP (ref 2–14)
NEUTROPHILS # BLD AUTO: 2.57 K/UL — SIGNIFICANT CHANGE UP (ref 1.8–7.4)
NEUTROPHILS NFR BLD AUTO: 45.8 % — SIGNIFICANT CHANGE UP (ref 43–77)
NRBC # BLD: 0 /100 WBCS — SIGNIFICANT CHANGE UP (ref 0–0)
PLATELET # BLD AUTO: 194 K/UL — SIGNIFICANT CHANGE UP (ref 150–400)
RBC # BLD: 3.96 M/UL — SIGNIFICANT CHANGE UP (ref 3.8–5.2)
RBC # FLD: 12.5 % — SIGNIFICANT CHANGE UP (ref 10.3–14.5)
WBC # BLD: 5.61 K/UL — SIGNIFICANT CHANGE UP (ref 3.8–10.5)
WBC # FLD AUTO: 5.61 K/UL — SIGNIFICANT CHANGE UP (ref 3.8–10.5)

## 2019-07-17 PROCEDURE — 99284 EMERGENCY DEPT VISIT MOD MDM: CPT

## 2019-07-17 PROCEDURE — 85025 COMPLETE CBC W/AUTO DIFF WBC: CPT

## 2019-07-17 PROCEDURE — 87389 HIV-1 AG W/HIV-1&-2 AB AG IA: CPT

## 2019-07-17 PROCEDURE — 36415 COLL VENOUS BLD VENIPUNCTURE: CPT

## 2019-07-17 NOTE — ED ADULT NURSE NOTE - OBJECTIVE STATEMENT
Pt presents complaining of vaginal bleeding. Pt states "My last menstrual period started on 7/5/19 and lasted for a week, which is normal for me. I started bleeding again 2 days ago". Pt denies any vaginal discharge, no odor. Pt endorses sexual activity and not currently taking birth control. Pt denies any fevers, no lightheadedness, no dizziness, no headache, no cp, no sob, no n/v, no changes to bowels, no urinary complaints.

## 2019-07-17 NOTE — ED ADULT TRIAGE NOTE - OTHER COMPLAINTS
patient reports vaginal bleeding x 2 days---LMP 1 week ago---denies vaginal discharge/foul odor/fevers/chills/SOB

## 2019-07-17 NOTE — ED PROVIDER NOTE - PHYSICAL EXAMINATION
CONST: Well appearing, well nourished, awake, alert, in no apparent distress.  HENMT: Airway patent, Nasal mucosa clear. Mouth with normal mucosa.  EYES: clear b/l, PRRL, EOMI,   CARDIAC: Normal rate, regular rhythm. No murmurs, rubs or gallops.  RESP: Breath sounds clear and equal bilaterally. no wheezes, no rales, no rhonchi  GI: Abdomen soft, non-tender, no guarding. BS+ in all 4 quadrants  MSK: Spine appears normal, all extremities grossly normal; range of motion is not limited, no muscle or joint tenderness, no deformities.  NEURO: Alert and oriented, no focal deficits, no motor or sensory deficits.  SKIN: Skin normal color for race, warm, dry and intact. No evidence of rash.  PSYCH: Alert and oriented to person, place, time/situation. normal mood and affect. no apparent risk to self or others. CONST: Well appearing, well nourished, awake, alert, in no apparent distress.  HENMT: Airway patent, Nasal mucosa clear. Mouth with normal mucosa.  EYES: clear b/l, PRRL, EOMI,   CARDIAC: Normal rate, regular rhythm. No murmurs, rubs or gallops.  RESP: Breath sounds clear and equal bilaterally. no wheezes, no rales, no rhonchi  GI: Abdomen soft, non-tender, no guarding. BS+ in all 4 quadrants  : no rash, no lesions to external genitalia, minimal bleeding in the vaginal vault, no uterine tenderness, no adnexal masses palpated  MSK: Spine appears normal, all extremities grossly normal; range of motion is not limited, no muscle or joint tenderness, no deformities.  NEURO: Alert and oriented, no focal deficits, no motor or sensory deficits.  SKIN: Skin normal color for race, warm, dry and intact. No evidence of rash.  PSYCH: Alert and oriented to person, place, time/situation. normal mood and affect. no apparent risk to self or others.

## 2019-07-17 NOTE — ED PROVIDER NOTE - OBJECTIVE STATEMENT
23-year-old female with a PMHx of ectopic pregnancy about 2 years ago, presenting to the ED with abnormal vaginal bleeding for about 2 days. Pt LMP was on 7/5/19 and her menstrual period was normal which lasted 6 days. Pt denies hx of similar sxs in the past. She is concerned about bleeding and lower abdominal pain. She denies fever, chills, nausea, vomiting diarrhea. 23-year-old female with a PMHx of ruptured ectopic pregnancy about 2 years ago, anemia and blood transfusions after, PE,  presenting to the ED with abnormal vaginal bleeding for the past 2 days. Pt states her LMP was on 7/5/19 and  it  was normal, regular as usual, lasted 6 days. Pt denies hx  of irregular bleeding  in the past. She is concerned about bleeding and lower abdominal pain, concerned about possible pregnancy , as she is not using any protection, pt is not on OCP, not on blood thinners. .  PT denies fever, chills, nausea, vomiting diarrhea.

## 2019-07-17 NOTE — ED ADULT NURSE NOTE - NSIMPLEMENTINTERV_GEN_ALL_ED
Implemented All Universal Safety Interventions:  [place] to call system. Call bell, personal items and telephone within reach. Instruct patient to call for assistance. Room bathroom lighting operational. Non-slip footwear when patient is off stretcher. Physically safe environment: no spills, clutter or unnecessary equipment. Stretcher in lowest position, wheels locked, appropriate side rails in place.

## 2019-07-17 NOTE — ED PROVIDER NOTE - ATTENDING CONTRIBUTION TO CARE
Pt w/ PMHx anemia and blood transfusion, , hx regular periods, LMP 7/5 p/w vag bleeding x 2 days, and diffuse lower abdominal cramping, feels like menstrual cramps. Pt was concerned she might be pregnant, w/ ectopic in the past. Pt did not take a home pregnancy test. No f/c. No n/v/d or constipation. No known hx ovarian cysts or fibroids. No hx STDs. No OCP use. No IUD. Pt used 2 pads throughout the day yesterday. Pt w/ PMHx anemia and blood transfusion, , hx regular periods, LMP 7/5 p/w vag bleeding x 2 days, and diffuse lower abdominal cramping, feels like menstrual cramps. Pt was concerned she might be pregnant, w/ ectopic in the past. Pt did not take a home pregnancy test. No f/c. No n/v/d or constipation. No known hx ovarian cysts or fibroids. No hx STDs. No OCP use. No IUD. Pt used 2 pads throughout the day yesterday.  Constitutional: Well appearing, well nourished, awake, alert, oriented to person, place, time/situation and in no apparent distress.  ENMT: Airway patent. Normal MM  Eyes: Clear bilaterally  Gastrointestinal: Abd soft, NT, ND, NABS. No guarding, rebound, or rigidity. No pulsatile abdominal masses. No organomegaly appreciated. No CVAT   : preformed by MAJO Yan, reviewed  Musculoskeletal: Range of motion is not limited  Neuro: Alert and oriented x 3, face symmetric and speech fluent. Strength 5/5 x 4 ext and symmetric, nml gross motor movement, nml gait. No focal deficits noted.  Skin: Skin normal color for race, warm, dry and intact. No evidence of rash.  Psych: Alert and oriented to person, place, time/situation. normal mood and affect. no apparent risk to self or others.  Menstrual cramp, irregular menses, not pregnant. No heavy bleeding on exam. VSS. H/H wnl. F/u gyn

## 2019-07-17 NOTE — ED PROVIDER NOTE - CLINICAL SUMMARY MEDICAL DECISION MAKING FREE TEXT BOX
22 yo female, , h/o ruptured ectopic pregnancy in the past, anemia and blood transfusion after the surgery, present to ER c/o abnormal vaginal bleeding. Pt concerned that she is bleeding few days after her normal regular menstruation, also concerned that she has some mid pelvic cramping pain. Pt hemodynamically stable and well appearing, has UCG- negative, mild bleeding on pelvic exam and soft abdomen. will check CBC, anticipate d/c home for further f/u with GYN.

## 2019-07-17 NOTE — ED PROVIDER NOTE - NSFOLLOWUPINSTRUCTIONS_ED_ALL_ED_FT
I have discussed the discharge plan with the patient. The patient agrees with the plan, as discussed.  The patient understands Emergency Department diagnosis is a preliminary diagnosis often based on limited information and that the patient must adhere to the follow-up plan as discussed.  The patient understands that if the symptoms worsen or if prescribed medications do not have the desired/planned effect that the patient may return to the Emergency Department at any time for further evaluation and treatment.        MENSTRUATION - General Information     Menstruation    WHAT YOU NEED TO KNOW:    What do I need to know about menstruation?     Menstruation is also called your monthly period. Menstruation usually starts at about 12 years old. Some girls may have their first period as early as 9 years of age or as late as 16 years or older. Menopause is the time when menstruations stops. This usually happens around 50 years of age.       Menstruation is part of a cycle that helps prepare your body for pregnancy. During your menstrual cycle each month, your hormone levels increase. The lining of your uterus becomes thicker, and ovulation happens. Ovulation is when your ovaries release an egg. If the egg does not get fertilized, the lining of your uterus sheds and menstruation happens. Menstruation usually happens every 21 to 28 days.    What happens each month? Each period may last for 2 to 7 days and can be light, moderate, or heavy. The total amount of blood loss may be 1 to 4 tablespoons (20 to 60 milliliters) for the whole menstrual period. This amount may be different among women and it may be different for you from one period to another.     What symptoms may I have before my period starts? These symptoms are part of premenstrual syndrome (PMS) and usually go away when your period starts. Ask your healthcare provider for more information about any of the following:     Mood changes such as feeling irritated, sad, or emotional      Breast swelling or soreness      Feeling bloated      Tiredness      Headache      Problems with sleep      Dizziness      Nausea    How can I care for myself during menstruation?     NSAIDs, such as ibuprofen, help decrease PMS symptoms. This medicine is available with or without a doctor's order. Take them as directed. NSAIDs can cause stomach bleeding or kidney problems in certain people. If you take blood thinner medicine, always ask your healthcare provider if NSAIDs are safe for you. Always read the medicine label and follow directions.      Use tampons or sanitary pads. Read the instructions carefully or ask how to use tampons or sanitary pads.   Always wash your hands before you put in a new tampon to prevent infection. Wash your hands after you change pads or tampons.      Change your pad or tampon about every 3 to 4 hours to keep the blood from soaking through your clothes. Change your tampon often to help prevent toxic shock syndrome (TSS). This rare condition is caused by a bacteria and may be related to leaving a tampon in for a long time. Alternate tampons and pads during the day. Use sanitary pads at night. This may help prevent TSS.      Wrap toilet paper around the pad or tampon and throw it in the trash. Do not flush the pad or tampon down the toilet. It can block up  lines.    What is toxic shock syndrome (TSS)? TSS is a rare condition caused by a bacteria and may be related to leaving a tampon in for a long time. Alternate tampons and pads during the day. Use sanitary pads at night. This may help prevent TSS.    When should I seek immediate care?     You have severe abdominal cramps.      You have any of the following during or after you use tampons:  Fever and chills      Diarrhea      Vomiting      Lightheadedness or confusion      A rash      Muscle aches    When should I contact my healthcare provider?     You change pads or tampons every 1 hour or more often.      You skip periods or they are irregular.      Your periods last longer than 7 days.      You periods occur more often than every 21 days or less often than every 45 days.      You have questions or concerns about your condition or care.    CARE AGREEMENT:    You have the right to help plan your care. Learn about your health condition and how it may be treated. Discuss treatment options with your healthcare providers to decide what care you want to receive. You always have the right to refuse treatment.

## 2019-07-18 PROBLEM — I26.99 OTHER PULMONARY EMBOLISM WITHOUT ACUTE COR PULMONALE: Chronic | Status: ACTIVE | Noted: 2017-12-15

## 2019-07-18 PROBLEM — O00.90 UNSPECIFIED ECTOPIC PREGNANCY WITHOUT INTRAUTERINE PREGNANCY: Chronic | Status: ACTIVE | Noted: 2017-12-15

## 2021-08-02 NOTE — ED ADULT NURSE NOTE - CHPI ED SYMPTOMS NEG
Information sent to Alicia   no decreased eating/drinking/no chills/no nausea/no fever/no tingling/no vomiting/no weakness/no dizziness/no numbness

## 2022-09-12 NOTE — ED PROVIDER NOTE - PMH
Ectopic pregnancy    Pulmonary embolism Consent: The rationale for the repair was explained to the patient and consent was obtained. The risks, benefits and alternatives to therapy were discussed in detail. Specifically, the risks of infection, scarring, bleeding, prolonged wound healing, incomplete removal, allergy to anesthesia, nerve injury and recurrence were addressed. Prior to the procedure, the treatment site was clearly identified and confirmed by the patient. All components of Universal Protocol/PAUSE Rule completed.

## 2023-03-03 NOTE — ED ADULT NURSE NOTE - RN DISCHARGE SIGNATURE
[Arthralgia] : arthralgia [Joint Pain] : joint pain [Joint Stiffness] : joint stiffness [Negative] : Heme/Lymph 22-Dec-2017